# Patient Record
Sex: MALE | Race: WHITE | NOT HISPANIC OR LATINO | URBAN - METROPOLITAN AREA
[De-identification: names, ages, dates, MRNs, and addresses within clinical notes are randomized per-mention and may not be internally consistent; named-entity substitution may affect disease eponyms.]

---

## 2018-07-24 ENCOUNTER — SURGERY - HEALTHEAST (OUTPATIENT)
Dept: SURGERY | Facility: HOSPITAL | Age: 58
End: 2018-07-24

## 2018-07-24 ENCOUNTER — ANESTHESIA - HEALTHEAST (OUTPATIENT)
Dept: SURGERY | Facility: HOSPITAL | Age: 58
End: 2018-07-24

## 2018-07-24 ASSESSMENT — MIFFLIN-ST. JEOR
SCORE: 1472.13
SCORE: 1456.25

## 2021-06-01 VITALS — WEIGHT: 139.8 LBS | HEIGHT: 72 IN | BODY MASS INDEX: 18.93 KG/M2

## 2021-06-19 NOTE — ANESTHESIA POSTPROCEDURE EVALUATION
Patient: Garret Kearney  ROBOTIC RADICAL PROSTATECTOMY WITH BILATERAL PELVIC LYMOH NIDE DISSECTION  Anesthesia type: general    Patient location: PACU  Last vitals:   Vitals:    07/24/18 1421   BP: 154/78   Pulse: 96   Resp: 16   Temp: 37.5  C (99.5  F)   SpO2: 98%     Post vital signs: stable  Level of consciousness: awake and responds to simple questions  Post-anesthesia pain: pain controlled  Post-anesthesia nausea and vomiting: no  Pulmonary: unassisted, return to baseline  Cardiovascular: stable and blood pressure at baseline  Hydration: adequate  Anesthetic events: no    QCDR Measures:  ASA# 11 - Krupa-op Cardiac Arrest: ASA11B - Patient did NOT experience unanticipated cardiac arrest  ASA# 12 - Krupa-op Mortality Rate: ASA12B - Patient did NOT die  ASA# 13 - PACU Re-Intubation Rate: ASA13B - Patient did NOT require a new airway mgmt  ASA# 10 - Composite Anes Safety: ASA10A - No serious adverse event    Additional Notes:

## 2021-06-19 NOTE — ANESTHESIA PREPROCEDURE EVALUATION
Anesthesia Evaluation      Patient summary reviewed   No history of anesthetic complications     Airway   Mallampati: I  Neck ROM: full   Pulmonary     breath sounds clear to auscultation  (+) a smoker  (-) pneumonia, COPD, asthma, shortness of breath, recent URI, sleep apnea, rhonchi, wheezes, rales, stridor    ROS comment: Current everyday smoker                         Cardiovascular   Exercise tolerance: > or = 4 METS  (-) hypertension, past MI, CAD, angina, CHF, murmur, hypercholesterolemia  ECG reviewed (7/19/18: NSR, 73 bpm)  Rhythm: regular  Rate: normal,    no murmur      Neuro/Psych    (+) neuromuscular disease,  anxiety/panic attacks, chronic pain  (-) no seizures, no CVA, no Parkinson's disease, no depression, no dementia    Comments: Opioid abuse in the past and now on buprenorphine; low back pain    Endo/Other    (+) arthritis,   (-) no diabetes, hypothyroidism, no obesity     GI/Hepatic/Renal    (-) no hiatal hernia, GERD, impaired hepatic function, renal disease          Dental - normal exam   (+) poor dentition    Comment: No loose, chipped, partial, removable or dentures.                       Anesthesia Plan  Planned anesthetic: general endotracheal  He has been off of his suboxone for 2 weeks and transitioned to PO oxycodone. Reports adequate pain control. Given chronic pain history we will plan for ketamine bolus, discuss ketorolac with surgeon.  ASA 3   Induction: intravenous   Anesthetic plan and risks discussed with: patient and spouse    Post-op plan: routine recovery

## 2021-06-19 NOTE — ANESTHESIA CARE TRANSFER NOTE
Last vitals:   Vitals:    07/24/18 1026   BP: 147/75   Pulse: 98   Resp: 27   Temp: 36.5  C (97.7  F)   SpO2: 100%     Patient's level of consciousness is drowsy  Spontaneous respirations: yes  Maintains airway independently: yes  Dentition unchanged: yes  Oropharynx: oropharynx clear of all foreign objects    QCDR Measures:  ASA# 20 - Surgical Safety Checklist: WHO surgical safety checklist completed prior to induction  PQRS# 430 - Adult PONV Prevention: 4558F - Pt received => 2 anti-emetic agents (different classes) preop & intraop  ASA# 8 - Peds PONV Prevention: NA - Not pediatric patient, not GA or 2 or more risk factors NOT present  PQRS# 424 - Krupa-op Temp Management: 4559F - At least one body temp DOCUMENTED => 35.5C or 95.9F within required timeframe  PQRS# 426 - PACU Transfer Protocol: - Transfer of care checklist used  ASA# 14 - Acute Post-op Pain: ASA14B - Patient did NOT experience pain >= 7 out of 10